# Patient Record
Sex: FEMALE | Race: WHITE | Employment: OTHER | URBAN - METROPOLITAN AREA
[De-identification: names, ages, dates, MRNs, and addresses within clinical notes are randomized per-mention and may not be internally consistent; named-entity substitution may affect disease eponyms.]

---

## 2019-08-31 ENCOUNTER — ED HISTORICAL/CONVERTED ENCOUNTER (OUTPATIENT)
Dept: OTHER | Age: 69
End: 2019-08-31

## 2019-10-23 ENCOUNTER — OP HISTORICAL/CONVERTED ENCOUNTER (OUTPATIENT)
Dept: OTHER | Age: 69
End: 2019-10-23

## 2019-11-23 ENCOUNTER — ED HISTORICAL/CONVERTED ENCOUNTER (OUTPATIENT)
Dept: OTHER | Age: 69
End: 2019-11-23

## 2020-09-03 VITALS
HEIGHT: 61 IN | HEART RATE: 87 BPM | WEIGHT: 186.2 LBS | BODY MASS INDEX: 35.16 KG/M2 | SYSTOLIC BLOOD PRESSURE: 168 MMHG | DIASTOLIC BLOOD PRESSURE: 98 MMHG | OXYGEN SATURATION: 98 % | TEMPERATURE: 98 F

## 2020-09-14 ENCOUNTER — OFFICE VISIT (OUTPATIENT)
Dept: INTERNAL MEDICINE CLINIC | Age: 70
End: 2020-09-14
Payer: MEDICARE

## 2020-09-14 VITALS
HEIGHT: 56 IN | BODY MASS INDEX: 42.02 KG/M2 | OXYGEN SATURATION: 100 % | SYSTOLIC BLOOD PRESSURE: 174 MMHG | DIASTOLIC BLOOD PRESSURE: 95 MMHG | HEART RATE: 89 BPM | WEIGHT: 186.8 LBS | TEMPERATURE: 98.4 F

## 2020-09-14 DIAGNOSIS — R53.82 CHRONIC FATIGUE: ICD-10-CM

## 2020-09-14 DIAGNOSIS — I83.813 VARICOSE VEINS OF BOTH LOWER EXTREMITIES WITH PAIN: ICD-10-CM

## 2020-09-14 DIAGNOSIS — E66.01 OBESITY, MORBID (HCC): ICD-10-CM

## 2020-09-14 DIAGNOSIS — M15.9 OSTEOARTHRITIS INVOLVING MULTIPLE JOINTS ON BOTH SIDES OF BODY: ICD-10-CM

## 2020-09-14 DIAGNOSIS — I10 ESSENTIAL HYPERTENSION: Primary | ICD-10-CM

## 2020-09-14 PROCEDURE — G0444 DEPRESSION SCREEN ANNUAL: HCPCS | Performed by: INTERNAL MEDICINE

## 2020-09-14 PROCEDURE — 99215 OFFICE O/P EST HI 40 MIN: CPT | Performed by: INTERNAL MEDICINE

## 2020-09-14 RX ORDER — ZOLPIDEM TARTRATE 5 MG/1
TABLET ORAL
COMMUNITY
Start: 2020-09-04 | End: 2020-09-14 | Stop reason: ALTCHOICE

## 2020-09-14 RX ORDER — AMLODIPINE BESYLATE 5 MG/1
5 TABLET ORAL DAILY
Qty: 30 TAB | Refills: 2 | Status: SHIPPED | OUTPATIENT
Start: 2020-09-14 | End: 2020-10-14

## 2020-09-14 RX ORDER — ERYTHROMYCIN 5 MG/G
OINTMENT OPHTHALMIC
COMMUNITY
End: 2020-09-14 | Stop reason: ALTCHOICE

## 2020-09-14 RX ORDER — HYDROCODONE BITARTRATE AND ACETAMINOPHEN 5; 325 MG/1; MG/1
1 TABLET ORAL DAILY
COMMUNITY

## 2020-09-14 NOTE — PROGRESS NOTES
Juana Conway is a 72 y.o. female and presents with Insomnia; Back Pain; Leg Pain; Foot Swelling; Foot Pain; Urinary Frequency; and Anxiety    Ms. Abebe Romero is here for follow up, last appointment was acute visit for tinea cruris, then she called for worsening of it, was referred to Dermatology, last follow up was in February:  \"Ms. Sun Garcia is here for follow up, last visit 12/11/2019 for medicare wellness, back then her blood pressure was too high, she decided to stop losartan saying it was giving her nightmares and they stopped when she stopped, so we started HCTZ. She has a list of 26 medication side effects, according to her she has a wide range of side effects from lisinopril, hydralazine, metoprolol, benicar, clonidine, diovan, lortab, and she absolutely refuses to try any of them again ever, so that leaves me with very few options for treatment. Her blood pressure is high today, she brought BP readings from home. She decided to take the HCTZ every 48 hours because she says it gave her dry mouth, and also said that since it makes her urinate more it is draining the fluid from her joints, so he is absolutely refusing to take it every day. She says she suspects as the rest of the medications she has tried in her life, this is giving her side effects as well from what she reads in google. She brought blood pressure diary, at home her blood pressure has been acceptable, most of the times within normal limits. Scanned into chart  She says her legs are bothering her. She says her knees have been hurting, specially when she walks, she feels her right thigh feels like she pulled a muscle which makes it difficult to walk, getting in and out of bed, she feels a pull when she tries to raise the leg into the bed. She takes hydrocodone/acetaminophen and tramadol which has been prescribed by pain management for unspecified chronic pain syndrome.  She says she lost her pain management doctor, and now she has to see a nurse practitioner in the same clinic, she asked me if I could continue prescribing her hydrocodone and tramadol. I told her she has to continue going to pain management. She says she will have a bladder test done in Feb 29th with [facility] for incontinence. essential hypertension  Today significantly high, but according to her bp diary it has been controlled, she has issues also with the HCTZ as mentioned in HPI. She has issues with almost every medication she takes (list of 26 medications in allergies), google's side effects and says she has them, and makes conclusions like the one she made today about HCTZ draining the fluid from her joints. I explained her this is not an accurate statement, her joint pain is not because of the HCTZ, for now she decided to agree to take it, but she has decided to take it every other day. Time for labs   Check CBC with differential/platelet  Check [id number]  Change hydroCHLOROthiazide 12.5 mg capsule from TAKE 1 CAPSULE BY MOUTH ONCE DAILY FOR 30 DAYS to 1 capsule every day for 90 days  hypothyroidism  Time to check TFTs. Off note: She asked the  to tell me to add cortisol to her labs, she does not need to have cortisol checked. Check TSH+free [id number]  osteoarthritis of knee  I counseled her about importance of weight loss, she says she's doing intermittent fasting. Order XR, knee, 3 view  pain of right thigh  findings as mentioned, she can try pt  Send for physical therapy referral\"    Last labs from June 2020 show A1C 5.7%, glucose 1455, gfr 65, normal electrolytes and calcium, cbc wnl, TFTs wnl from Feb 2020. Today she has several complaints, she gave me a chart with all her symptoms, which is scanned, she says that she's not doing good at all, what bothers her the mostly multiple joint pains in knees, right ankle, right thumb and right index finger, she says achy and sharp when she uses her joints, she denies any swelling or redness.  She had knee replacement in 2004 and 2006. She says she had history of \"miscplaced kneecaps\" and she had frequent falls when she was younger. She's currently on norco and tramadol under the care of pain management. She says this helps for a short time, she takes excedrin as well, but she doesn't feel functional as she wishes to feel. She also says she has fragile nails and hair loss, dry eye and dry mouth, swelling of feet and ankles. She did recently an initial evaluation off incontinence at [facility], but she did not schedule the cystoscopy because many family issues were going on she says the plan was to place a neural stimulaator but she's scared about that and wants a second opinion, but she is not yet sure of getting a second opinion quite this moment. She will let me know    For her blood pressure she brought blood pressure diary again today, many of the readings are within normal limits but some others are systolics in the 224S and 150s, readings scanned in chart. Today her blood pressure is 174/95. Review of Systems  Review of Systems   Constitutional: Negative for chills, fatigue, fever and unexpected weight change. HENT: Negative for congestion, ear pain, sneezing and sore throat. Eyes: Negative for pain and discharge. Respiratory: Negative for cough, shortness of breath and wheezing. Cardiovascular: Positive for leg swelling. Negative for chest pain and palpitations. Gastrointestinal: Negative for abdominal pain, blood in stool, constipation and diarrhea. Endocrine: Negative for polydipsia and polyuria. As mentioned in HPI   Genitourinary: Negative for difficulty urinating, dysuria, frequency, hematuria and urgency. Musculoskeletal: Positive for arthralgias. Negative for back pain and joint swelling. Skin: Negative for rash. Allergic/Immunologic: Negative for environmental allergies and food allergies.    Neurological: Negative for dizziness, speech difficulty, weakness, light-headedness, numbness and headaches. Hematological: Negative for adenopathy. Psychiatric/Behavioral: Negative for behavioral problems (Depression), sleep disturbance and suicidal ideas. Past Medical History:   Diagnosis Date    Anxiety     Arthritis     Bell's palsy     Chronic pain syndrome     Dislocated knee     Dysuria     Hyperglycemia     Hypertension     Hypothyroidism     IBS (irritable bowel syndrome)     Insomnia     Primary fibromyalgia syndrome     Spinal stenosis of lumbar region      Past Surgical History:   Procedure Laterality Date    HX BACK SURGERY      Lower    HX CARPAL TUNNEL RELEASE Right     HX  SECTION      2    HX COLONOSCOPY  2019    HX KNEE REPLACEMENT      both    HX OTHER SURGICAL Right     Trigger thumb     Social History     Socioeconomic History    Marital status:      Spouse name: Not on file    Number of children: Not on file    Years of education: Not on file    Highest education level: Not on file   Tobacco Use    Smoking status: Never Smoker    Smokeless tobacco: Never Used   Substance and Sexual Activity    Alcohol use: No     Alcohol/week: 0.0 standard drinks     Family History   Problem Relation Age of Onset    Thyroid Disease Mother     Cancer Mother     Cancer Father     Heart Disease Father     Thyroid Disease Sister      Current Outpatient Medications   Medication Sig Dispense Refill    compr.stocking,knee,long,large (Comp Stocking,Knee,Long,Large) misc Use during the day every day 2 Each 0    HYDROcodone-acetaminophen (NORCO) 5-325 mg per tablet Take 1 Tab by mouth daily.  amLODIPine (NORVASC) 5 mg tablet Take 1 Tab by mouth daily for 30 days. Indications: high blood pressure 30 Tab 2    melatonin 5 mg cap capsule Take 5 mg by mouth.  Calcium-Cholecalciferol, D3, 600 mg(1,500mg) -400 unit cap Take 1 Tab by mouth daily.  magnesium oxide (MAG-OX) 400 mg tablet Take 400 mg by mouth daily.       ZINC GLUCONATE PO Take 15 mg by mouth daily.  ASCORBATE CALCIUM (VITAMIN C PO) Take 1,000 mg by mouth daily.  potassium 99 mg tablet Take 99 mg by mouth daily.  omega-3 fatty acids-vitamin e 1,000 mg cap Take 1 Cap by mouth daily.  VITAMINS A AND D PO Take 1 Tab by mouth daily.  traMADol (ULTRAM) 50 mg tablet Take 50 mg by mouth nightly.  diphenoxylate-atropine (LOMOTIL) 2.5-0.025 mg per tablet Take 1 Tab by mouth as needed for Diarrhea.  cyclobenzaprine (FLEXERIL) 10 mg tablet Take 10 mg by mouth as needed for Muscle Spasm(s).  LACTOBACILLUS ACIDOPHILUS (PROBIOTIC PO) Take 1 Tab by mouth daily.  METHYLCELLULOSE (FIBER THERAPY) Take 1 Tab by mouth daily.  niacin  mg TbER Take 1 Tab by mouth daily.  Flaxseed Oil oil Take 1 Tab by mouth daily.  vitamin E (AQUA GEMS) 400 unit capsule Take 400 Units by mouth daily.  multivitamin (ONE A DAY) tablet Take 1 Tab by mouth daily.  cyanocobalamin (VITAMIN B12) 100 mcg tablet Take 100 mcg by mouth daily.  GARLIC PO Take 1 Tab by mouth daily.  Cranberry Extract 200 mg cap Take 3 Tabs by mouth daily.  docusate sodium (COLACE) 100 mg capsule Take 100 mg by mouth two (2) times a day.  acetaminophen-caffeine 500-65 mg (EXCEDRINE TENSION HEADACHE) 500-65 mg tab Take 2 Tabs by mouth every four (4) hours as needed for Headache.  levothyroxine (SYNTHROID) 75 mcg tablet Take 75 mcg by mouth Daily (before breakfast).  coenzyme q10 10 mg cap Take 1 Tab by mouth daily.        Allergies   Allergen Reactions    Benicar [Olmesartan] Other (comments)     Leg cramp    Caduet [Amlodipine-Atorvastatin] Other (comments)     Leg cramp    Celexa [Citalopram] Other (comments)     Malaise, fatigue    Chromium Unknown (comments)    Clonidine Other (comments)     Belching, leg cramp, stiffness, dry mouth    Crestor [Rosuvastatin] Other (comments)     Leg cramp    Cymbalta [Duloxetine] Other (comments) Insomnia    Declomycin [Demeclocycline] Unknown (comments)    Diovan [Valsartan] Myalgia    Effexor [Venlafaxine] Other (comments)     insomnia    Floxin [Ofloxacin] Other (comments)     Chest pain    Gabapentin Other (comments)     Extreme leg cramps, burning pain in intestines    Hydralazine Palpitations    Lexapro [Escitalopram] Other (comments)     General malaize    Lisinopril Other (comments)     Leg cramp      Lotrel [Amlodipine-Benazepril] Other (comments)     Leg cramp      Metoprolol Other (comments)     Belching,leg cramp,stiffness,dyr mouth    Metronidazole Nausea and Vomiting    Midol Ib Anxiety    Percocet [Oxycodone-Acetaminophen] Other (comments)     Leg pain      Restasis [Cyclosporine] Other (comments)     Photo sensitivity    Wellbutrin [Bupropion Hcl] Other (comments)     Menstrual cramps    Ziac [Bisoprolol-Hydrochlorothiazide] Shortness of Breath    Zoloft [Sertraline] Other (comments)     Blurred vision       Objective:  Visit Vitals  BP (!) 174/95 (BP 1 Location: Left leg, BP Patient Position: Sitting)   Pulse 89   Temp 98.4 °F (36.9 °C) (Oral)   Ht 4' 8\" (1.422 m)   Wt 186 lb 12.8 oz (84.7 kg)   SpO2 100% Comment: RA   BMI 41.88 kg/m²     Physical Exam:   Physical Exam  Constitutional:       General: She is not in acute distress. Appearance: Normal appearance. She is morbidly obese. HENT:      Head: Normocephalic and atraumatic. Mouth/Throat:      Mouth: Mucous membranes are moist.   Eyes:      Extraocular Movements: Extraocular movements intact. Conjunctiva/sclera: Conjunctivae normal.      Pupils: Pupils are equal, round, and reactive to light. Neck:      Musculoskeletal: Normal range of motion and neck supple. Cardiovascular:      Rate and Rhythm: Normal rate and regular rhythm. Pulses: Normal pulses. Heart sounds: Normal heart sounds. Pulmonary:      Effort: Pulmonary effort is normal.      Breath sounds: Normal breath sounds.    Abdominal: General: Abdomen is flat. Bowel sounds are normal. There is no distension. Palpations: Abdomen is soft. There is no mass. Tenderness: There is no abdominal tenderness. Musculoskeletal:         General: No swelling or deformity. Right lower leg: No edema. Left lower leg: No edema. Comments: Grade 1 varicose veins lower extremities bilateral, ankle swelling, no pitting edema. Tender   Lymphadenopathy:      Cervical: No cervical adenopathy. Skin:     General: Skin is warm and dry. Capillary Refill: Capillary refill takes less than 2 seconds. Coloration: Skin is not jaundiced or pale. Findings: No erythema or rash. Neurological:      General: No focal deficit present. Mental Status: She is alert and oriented to person, place, and time. Psychiatric:         Mood and Affect: Mood normal.         Behavior: Behavior normal.         Thought Content: Thought content normal.         Judgment: Judgment normal.          No results found for this or any previous visit. Assessment/Plan:    Time spent with patient: 45 minutes 50% of which was in coordination of care/counseling      Hypertension is not at goal, needs fluctuating at home. She has stopped taking the hydrochlorothiazide due to symptoms that she believes are side effects. To currently she is not on any medication to control her blood pressure. I discussed with her the limited options we have as mentioned above, she is willing to try amlodipine and see how she feels on it. She has osteoarthritis of multiple joints, limiting, she is already on pain management both Nucynta and Norco, which helps with the pain but she seems not to be achieving functionality as she wishes to, I am referring her to rheumatology for additional therapeutic options.   She has nonspecific chronic fatigue, mentions dry skin, dry mouth, dry eyes, brittle nails, we have done labs before thyroid function is within normal limits and autoimmune profile that we did last year was within normal limits as well. Last thing I am going to check is a morning cortisol. Varicose veins as mentioned with some ankle swelling and pain, I recommend her to use compression stockings every day  Discussed about importance and efforts to lose weight, 1200 calorie diet given, recommended brisk walking 30 to 40 mins, as much as she can tolerate considering her osteoarthritis, goal to lose 5% of her weight in 3 months. ICD-10-CM ICD-9-CM    1. Essential hypertension  I10 401.9 amLODIPine (NORVASC) 5 mg tablet   2. Osteoarthritis involving multiple joints on both sides of body  M15.9 715.89 REFERRAL TO RHEUMATOLOGY      REFERRAL TO PHYSICAL THERAPY   3. Chronic fatigue  R53.82 780.79 CORTISOL, AM      REFERRAL TO PHYSICAL THERAPY   4. Varicose veins of both lower extremities with pain  I83.813 454.8 compr.stocking,knee,long,large (Comp Stocking,Knee,Long,Large) misc   5. Obesity, morbid ([facility] [address]  E66.01 278.01      Orders Placed This Encounter    CORTISOL, AM    REFERRAL TO RHEUMATOLOGY     Referral Priority:   Routine     Referral Type:   Consultation     Referral Reason:   Specialty Services Required     Referred to Provider:   Ofe Lee MD     Number of Visits Requested:   1    REFERRAL TO PHYSICAL THERAPY     Referral Priority:   Routine     Referral Type:   PT/OT/ST     Referral Reason:   Specialty Services Required     Referral Location:   [address] [place] [address]     Number of Visits Requested:   1    HYDROcodone-acetaminophen (NORCO) 5-325 mg per tablet     Sig: Take 1 Tab by mouth daily.  DISCONTD: zolpidem (AMBIEN) 5 mg tablet     Sig: TAKE 1 TABLET BY MOUTH ONCE DAILY AS NEEDED    DISCONTD: erythromycin (ILOTYCIN) ophthalmic ointment     Sig: erythromycin 5 mg/gram (0.5 %) eye ointment    amLODIPine (NORVASC) 5 mg tablet     Sig: Take 1 Tab by mouth daily for 30 days.  Indications: high blood pressure     Dispense:  30 Tab Refill:  2    compr.stocking,knee,long,large (Comp Stocking,Knee,Long,Large) misc     Sig: Use during the day every day     Dispense:  2 Each     Refill:  0     15 - 20 mmHg       There are no Patient Instructions on file for this visit. Follow-up and Dispositions    · Return in about 3 months (around 12/14/2020).

## 2020-09-15 PROBLEM — E66.01 OBESITY, MORBID (HCC): Status: ACTIVE | Noted: 2020-09-15

## 2020-10-01 LAB — CORTIS AM PEAK SERPL-MCNC: 14.2 UG/DL (ref 6.2–19.4)

## 2020-10-26 DIAGNOSIS — G47.00 INSOMNIA, UNSPECIFIED TYPE: Primary | ICD-10-CM

## 2020-10-26 RX ORDER — ZOLPIDEM TARTRATE 5 MG/1
5 TABLET ORAL
Qty: 30 TAB | Refills: 2 | Status: SHIPPED | OUTPATIENT
Start: 2020-10-26 | End: 2021-02-03

## 2020-11-25 ENCOUNTER — TELEPHONE (OUTPATIENT)
Dept: INTERNAL MEDICINE CLINIC | Age: 70
End: 2020-11-25

## 2020-11-25 DIAGNOSIS — M79.10 MYALGIA: Primary | ICD-10-CM

## 2020-11-25 RX ORDER — CYCLOBENZAPRINE HCL 10 MG
10 TABLET ORAL
Qty: 90 TAB | Refills: 2 | Status: SHIPPED | OUTPATIENT
Start: 2020-11-25 | End: 2021-02-23

## 2020-11-25 NOTE — TELEPHONE ENCOUNTER
Patient called and would like Dr. Carolin Dunn to send in her prescription for her muscle relaxer. Send to [address]

## 2021-02-02 DIAGNOSIS — G47.00 INSOMNIA, UNSPECIFIED TYPE: ICD-10-CM

## 2021-02-03 RX ORDER — ZOLPIDEM TARTRATE 5 MG/1
TABLET ORAL
Qty: 30 TAB | Refills: 2 | Status: SHIPPED | OUTPATIENT
Start: 2021-02-03

## 2022-03-20 PROBLEM — E66.01 OBESITY, MORBID (HCC): Status: ACTIVE | Noted: 2020-09-15

## 2022-05-03 ENCOUNTER — TELEPHONE (OUTPATIENT)
Dept: INTERNAL MEDICINE CLINIC | Age: 72
End: 2022-05-03

## 2022-05-03 NOTE — TELEPHONE ENCOUNTER
----- Message from [place] Amada sent at 4/29/2022  8:25 AM EDT -----  Subject: Appointment Request    Reason for Call: Urgent Adult Urinary Problem    QUESTIONS  Type of Appointment? New Patient/New to Provider  Reason for appointment request? No appointments available during search  Additional Information for Provider? pt is a former pt of Dr Marisol Tsang. pt   has a new pt appt on 8/29/2022, pt has a UTI currently that started   4/26/2022, symptoms include frequent urination, burning, lower abd pain,   genital pain pt would like a call back. ---------------------------------------------------------------------------  --------------  Kacy SOLITARIO  What is the best way for the office to contact you? OK to leave message on   voicemail  Preferred Call Back Phone Number? [phone]  ---------------------------------------------------------------------------  --------------  SCRIPT ANSWERS  Relationship to Patient? Self  Specialty Confirmation? Primary Care  Have you recently (14 days) seen a provider for this problem? No  Have you been diagnosed with, awaiting test results for, or told that you   are suspected of having COVID-19 (Coronavirus)? (If patient has tested   negative or was tested as a requirement for work, school, or travel and   not based on symptoms, answer no)? No  Within the past 10 days have you developed any of the following symptoms   (answer no if symptoms have been present longer than 10 days or began   more than 10 days ago)? Fever or Chills, Cough, Shortness of breath or   difficulty breathing, Loss of taste or smell, Sore throat, Nasal   congestion, Sneezing or runny nose, Fatigue or generalized body aches   (answer no if pain is specific to a body part e.g. back pain), Diarrhea,   Headache? No  Have you had close contact with someone with COVID-19 in the last 7 days? No  (Service Expert  click yes below to proceed with [facility] As Usual   Scheduling)?  Yes

## 2023-05-16 RX ORDER — MAGNESIUM OXIDE 400 MG/1
400 TABLET ORAL DAILY
COMMUNITY

## 2023-05-16 RX ORDER — DIPHENOXYLATE HYDROCHLORIDE AND ATROPINE SULFATE 2.5; .025 MG/1; MG/1
1 TABLET ORAL PRN
COMMUNITY

## 2023-05-16 RX ORDER — TRAMADOL HYDROCHLORIDE 50 MG/1
50 TABLET ORAL NIGHTLY
COMMUNITY

## 2023-05-16 RX ORDER — LEVOTHYROXINE SODIUM 0.07 MG/1
75 TABLET ORAL
COMMUNITY

## 2023-05-16 RX ORDER — LANOLIN ALCOHOL/MO/W.PET/CERES
1 CREAM (GRAM) TOPICAL DAILY
COMMUNITY

## 2023-05-16 RX ORDER — PSEUDOEPHEDRINE HCL 30 MG
100 TABLET ORAL 2 TIMES DAILY
COMMUNITY

## 2023-05-16 RX ORDER — ZOLPIDEM TARTRATE 5 MG/1
TABLET ORAL
COMMUNITY
Start: 2021-02-03

## 2023-05-16 RX ORDER — HYDROCODONE BITARTRATE AND ACETAMINOPHEN 5; 325 MG/1; MG/1
1 TABLET ORAL DAILY
COMMUNITY

## 2023-11-21 ENCOUNTER — OFFICE VISIT (OUTPATIENT)
Facility: CLINIC | Age: 73
End: 2023-11-21
Payer: MEDICARE

## 2023-11-21 VITALS
HEART RATE: 82 BPM | SYSTOLIC BLOOD PRESSURE: 121 MMHG | WEIGHT: 189.2 LBS | OXYGEN SATURATION: 100 % | HEIGHT: 61 IN | DIASTOLIC BLOOD PRESSURE: 78 MMHG | BODY MASS INDEX: 35.72 KG/M2 | RESPIRATION RATE: 20 BRPM | TEMPERATURE: 98.4 F

## 2023-11-21 DIAGNOSIS — Z00.00 MEDICARE ANNUAL WELLNESS VISIT, SUBSEQUENT: Primary | ICD-10-CM

## 2023-11-21 DIAGNOSIS — Z11.59 NEED FOR HEPATITIS C SCREENING TEST: ICD-10-CM

## 2023-11-21 DIAGNOSIS — R30.0 DYSURIA: ICD-10-CM

## 2023-11-21 DIAGNOSIS — N30.00 ACUTE CYSTITIS WITHOUT HEMATURIA: ICD-10-CM

## 2023-11-21 DIAGNOSIS — Z12.11 SCREENING FOR COLON CANCER: ICD-10-CM

## 2023-11-21 DIAGNOSIS — R79.89 ELEVATED CORTISOL LEVEL: ICD-10-CM

## 2023-11-21 DIAGNOSIS — R45.4 IRRITABILITY AND ANGER: ICD-10-CM

## 2023-11-21 DIAGNOSIS — Z78.0 ENCOUNTER FOR OSTEOPOROSIS SCREENING IN ASYMPTOMATIC POSTMENOPAUSAL PATIENT: ICD-10-CM

## 2023-11-21 DIAGNOSIS — I10 ESSENTIAL HYPERTENSION: ICD-10-CM

## 2023-11-21 DIAGNOSIS — Z13.820 ENCOUNTER FOR OSTEOPOROSIS SCREENING IN ASYMPTOMATIC POSTMENOPAUSAL PATIENT: ICD-10-CM

## 2023-11-21 DIAGNOSIS — E03.4 HYPOTHYROIDISM DUE TO ACQUIRED ATROPHY OF THYROID: ICD-10-CM

## 2023-11-21 DIAGNOSIS — Z12.31 ENCOUNTER FOR SCREENING MAMMOGRAM FOR MALIGNANT NEOPLASM OF BREAST: ICD-10-CM

## 2023-11-21 LAB
BILIRUBIN, URINE, POC: NEGATIVE
BLOOD URINE, POC: NEGATIVE
GLUCOSE URINE, POC: NEGATIVE
KETONES, URINE, POC: NEGATIVE
LEUKOCYTE ESTERASE, URINE, POC: NORMAL
NITRITE, URINE, POC: NEGATIVE
PH, URINE, POC: 6 (ref 4.6–8)
PROTEIN,URINE, POC: NEGATIVE
SPECIFIC GRAVITY, URINE, POC: 1 (ref 1–1.03)
URINALYSIS CLARITY, POC: CLEAR
URINALYSIS COLOR, POC: YELLOW
UROBILINOGEN, POC: NORMAL

## 2023-11-21 PROCEDURE — G8400 PT W/DXA NO RESULTS DOC: HCPCS | Performed by: INTERNAL MEDICINE

## 2023-11-21 PROCEDURE — 3078F DIAST BP <80 MM HG: CPT | Performed by: INTERNAL MEDICINE

## 2023-11-21 PROCEDURE — 3017F COLORECTAL CA SCREEN DOC REV: CPT | Performed by: INTERNAL MEDICINE

## 2023-11-21 PROCEDURE — G0439 PPPS, SUBSEQ VISIT: HCPCS | Performed by: INTERNAL MEDICINE

## 2023-11-21 PROCEDURE — 1036F TOBACCO NON-USER: CPT | Performed by: INTERNAL MEDICINE

## 2023-11-21 PROCEDURE — G8484 FLU IMMUNIZE NO ADMIN: HCPCS | Performed by: INTERNAL MEDICINE

## 2023-11-21 PROCEDURE — 1123F ACP DISCUSS/DSCN MKR DOCD: CPT | Performed by: INTERNAL MEDICINE

## 2023-11-21 PROCEDURE — G8417 CALC BMI ABV UP PARAM F/U: HCPCS | Performed by: INTERNAL MEDICINE

## 2023-11-21 PROCEDURE — 3074F SYST BP LT 130 MM HG: CPT | Performed by: INTERNAL MEDICINE

## 2023-11-21 PROCEDURE — G8427 DOCREV CUR MEDS BY ELIG CLIN: HCPCS | Performed by: INTERNAL MEDICINE

## 2023-11-21 PROCEDURE — 81003 URINALYSIS AUTO W/O SCOPE: CPT | Performed by: INTERNAL MEDICINE

## 2023-11-21 PROCEDURE — 99204 OFFICE O/P NEW MOD 45 MIN: CPT | Performed by: INTERNAL MEDICINE

## 2023-11-21 PROCEDURE — 1090F PRES/ABSN URINE INCON ASSESS: CPT | Performed by: INTERNAL MEDICINE

## 2023-11-21 RX ORDER — VIT A/VIT C/VIT E/ZINC/COPPER 2148-113
1 TABLET ORAL DAILY
COMMUNITY

## 2023-11-21 RX ORDER — CYCLOBENZAPRINE HCL 10 MG
10 TABLET ORAL 2 TIMES DAILY PRN
COMMUNITY
Start: 2023-11-17

## 2023-11-21 RX ORDER — SPIRONOLACTONE 25 MG/1
25 TABLET ORAL DAILY
COMMUNITY
Start: 2023-11-16

## 2023-11-21 RX ORDER — CHLORAL HYDRATE 500 MG
1000 CAPSULE ORAL DAILY
COMMUNITY

## 2023-11-21 RX ORDER — NITROFURANTOIN 25; 75 MG/1; MG/1
100 CAPSULE ORAL 2 TIMES DAILY
Qty: 14 CAPSULE | Refills: 0 | Status: SHIPPED | OUTPATIENT
Start: 2023-11-21 | End: 2023-11-28

## 2023-11-21 RX ORDER — LACTOBACILLUS ACIDOPHILUS 500MM CELL
1 CAPSULE ORAL DAILY
COMMUNITY

## 2023-11-21 RX ORDER — TRAMADOL HYDROCHLORIDE 50 MG/1
1 TABLET ORAL DAILY
COMMUNITY
Start: 2022-10-04

## 2023-11-21 RX ORDER — GLUCOSAM/CHON-MSM1/C/MANG/BOSW 750-644 MG
1 TABLET ORAL DAILY
COMMUNITY

## 2023-11-21 SDOH — ECONOMIC STABILITY: INCOME INSECURITY: HOW HARD IS IT FOR YOU TO PAY FOR THE VERY BASICS LIKE FOOD, HOUSING, MEDICAL CARE, AND HEATING?: NOT HARD AT ALL

## 2023-11-21 SDOH — ECONOMIC STABILITY: FOOD INSECURITY: WITHIN THE PAST 12 MONTHS, THE FOOD YOU BOUGHT JUST DIDN'T LAST AND YOU DIDN'T HAVE MONEY TO GET MORE.: NEVER TRUE

## 2023-11-21 SDOH — ECONOMIC STABILITY: FOOD INSECURITY: WITHIN THE PAST 12 MONTHS, YOU WORRIED THAT YOUR FOOD WOULD RUN OUT BEFORE YOU GOT MONEY TO BUY MORE.: NEVER TRUE

## 2023-11-21 SDOH — ECONOMIC STABILITY: HOUSING INSECURITY
IN THE LAST 12 MONTHS, WAS THERE A TIME WHEN YOU DID NOT HAVE A STEADY PLACE TO SLEEP OR SLEPT IN A SHELTER (INCLUDING NOW)?: NO

## 2023-11-21 ASSESSMENT — LIFESTYLE VARIABLES
HOW OFTEN DO YOU HAVE A DRINK CONTAINING ALCOHOL: NEVER
HOW MANY STANDARD DRINKS CONTAINING ALCOHOL DO YOU HAVE ON A TYPICAL DAY: PATIENT DOES NOT DRINK

## 2023-11-21 ASSESSMENT — PATIENT HEALTH QUESTIONNAIRE - PHQ9
1. LITTLE INTEREST OR PLEASURE IN DOING THINGS: 1
SUM OF ALL RESPONSES TO PHQ QUESTIONS 1-9: 2
SUM OF ALL RESPONSES TO PHQ9 QUESTIONS 1 & 2: 2
2. FEELING DOWN, DEPRESSED OR HOPELESS: 1

## 2023-11-21 NOTE — PROGRESS NOTES
Medicare Annual Wellness Visit    Francine Ortega is here for Medicare AWV    Assessment & Plan   Medicare annual wellness visit, subsequent  Screening for colon cancer  -     Cologuard (Fecal DNA Colorectal Cancer Screening)  Encounter for screening mammogram for malignant neoplasm of breast  -     ESTUARDO HAYDEE DIGITAL SCREEN BILATERAL; Future  Encounter for osteoporosis screening in asymptomatic postmenopausal patient  -     DEXA BONE DENSITY AXIAL SKELETON; Future  Essential hypertension  -     CBC with Auto Differential; Future  -     Comprehensive Metabolic Panel; Future  -     Lipid Panel; Future  Hypothyroidism due to acquired atrophy of thyroid  -     TSH; Future  -     T4, Free; Future  Dysuria  -     AMB POC URINALYSIS DIP STICK AUTO W/O MICRO  Elevated cortisol level  Comments:  As per patient she used to have high cortisol in the past  Orders:  -     CORTISOL AM, TOTAL; Future  Need for hepatitis C screening test  -     Hepatitis C Antibody; Future  Irritability and anger  -     Amb External Referral To Behavioral Health  Acute cystitis without hematuria  -     nitrofurantoin, macrocrystal-monohydrate, (MACROBID) 100 MG capsule; Take 1 capsule by mouth 2 times daily for 7 days, Disp-14 capsule, R-0Normal    Given resources for counseling. Updating HM. With her annual labs including cortisol level because she says that she used to have elevated cortisol years ago. She has a UTI, treat as above    Recommendations for Preventive Services Due: see orders and patient instructions/AVS.  Recommended screening schedule for the next 5-10 years is provided to the patient in written form: see Patient Instructions/AVS.     Return in 3 months (on 2/21/2024). Subjective   The following acute and/or chronic problems were also addressed today: We are doing medicare wellness and she is re establishing care as I have not seen her in more than 3 years since September 2020.    She has hypertension, hypothyroidism,

## 2023-11-23 LAB
ALBUMIN SERPL-MCNC: 4.2 G/DL (ref 3.8–4.8)
ALBUMIN/GLOB SERPL: 1.6 {RATIO} (ref 1.2–2.2)
ALP SERPL-CCNC: 82 IU/L (ref 44–121)
ALT SERPL-CCNC: 16 IU/L (ref 0–32)
AST SERPL-CCNC: 19 IU/L (ref 0–40)
BASOPHILS # BLD AUTO: 0.1 X10E3/UL (ref 0–0.2)
BASOPHILS NFR BLD AUTO: 1 %
BILIRUB SERPL-MCNC: 0.3 MG/DL (ref 0–1.2)
BUN SERPL-MCNC: 15 MG/DL (ref 8–27)
BUN/CREAT SERPL: 16 (ref 12–28)
CALCIUM SERPL-MCNC: 9.8 MG/DL (ref 8.7–10.3)
CHLORIDE SERPL-SCNC: 101 MMOL/L (ref 96–106)
CHOLEST SERPL-MCNC: 211 MG/DL (ref 100–199)
CO2 SERPL-SCNC: 26 MMOL/L (ref 20–29)
CREAT SERPL-MCNC: 0.93 MG/DL (ref 0.57–1)
EGFRCR SERPLBLD CKD-EPI 2021: 65 ML/MIN/1.73
EOSINOPHIL # BLD AUTO: 0.6 X10E3/UL (ref 0–0.4)
EOSINOPHIL NFR BLD AUTO: 7 %
ERYTHROCYTE [DISTWIDTH] IN BLOOD BY AUTOMATED COUNT: 12.1 % (ref 11.7–15.4)
GLOBULIN SER CALC-MCNC: 2.6 G/DL (ref 1.5–4.5)
GLUCOSE SERPL-MCNC: 105 MG/DL (ref 70–99)
HCT VFR BLD AUTO: 44.3 % (ref 34–46.6)
HCV IGG SERPL QL IA: NON REACTIVE
HDLC SERPL-MCNC: 64 MG/DL
HGB BLD-MCNC: 14.6 G/DL (ref 11.1–15.9)
IMM GRANULOCYTES # BLD AUTO: 0 X10E3/UL (ref 0–0.1)
IMM GRANULOCYTES NFR BLD AUTO: 0 %
LDLC SERPL CALC-MCNC: 130 MG/DL (ref 0–99)
LYMPHOCYTES # BLD AUTO: 2.2 X10E3/UL (ref 0.7–3.1)
LYMPHOCYTES NFR BLD AUTO: 25 %
MCH RBC QN AUTO: 30.9 PG (ref 26.6–33)
MCHC RBC AUTO-ENTMCNC: 33 G/DL (ref 31.5–35.7)
MCV RBC AUTO: 94 FL (ref 79–97)
MONOCYTES # BLD AUTO: 1.1 X10E3/UL (ref 0.1–0.9)
MONOCYTES NFR BLD AUTO: 12 %
NEUTROPHILS # BLD AUTO: 4.9 X10E3/UL (ref 1.4–7)
NEUTROPHILS NFR BLD AUTO: 55 %
PLATELET # BLD AUTO: 365 X10E3/UL (ref 150–450)
POTASSIUM SERPL-SCNC: 4.9 MMOL/L (ref 3.5–5.2)
PROT SERPL-MCNC: 6.8 G/DL (ref 6–8.5)
RBC # BLD AUTO: 4.73 X10E6/UL (ref 3.77–5.28)
SODIUM SERPL-SCNC: 140 MMOL/L (ref 134–144)
T4 FREE SERPL-MCNC: 1.48 NG/DL (ref 0.82–1.77)
TRIGL SERPL-MCNC: 98 MG/DL (ref 0–149)
TSH SERPL DL<=0.005 MIU/L-ACNC: 3.47 UIU/ML (ref 0.45–4.5)
VLDLC SERPL CALC-MCNC: 17 MG/DL (ref 5–40)
WBC # BLD AUTO: 9 X10E3/UL (ref 3.4–10.8)

## 2023-11-24 LAB — CORTIS AM PEAK SERPL-MCNC: 12.6 UG/DL (ref 6.2–19.4)

## 2023-11-26 NOTE — RESULT ENCOUNTER NOTE
Please let her know  cholesterol is elevated, please give her diet recs.  All other results are normal, including cortisol, which was also normal 3 years, ago so no need to check on this anymore moving forward.  Thanks   Diet recs for high cholesterol:  Avoid sugary drinks, eat only 1 starch per meal, portion should be not more than 1 cup, avoid bad fats that are in anything deep fried, fast foods, eat good fats that are in avocado, nuts, fish, seafood, olive oil, eat lean meats

## 2024-01-18 LAB — NONINV COLON CA DNA+OCC BLD SCRN STL QL: NEGATIVE

## 2024-02-05 ENCOUNTER — TELEPHONE (OUTPATIENT)
Facility: CLINIC | Age: 74
End: 2024-02-05

## 2024-02-05 NOTE — TELEPHONE ENCOUNTER
Patient is requesting a refill on zolpidem 5 mg to [facility]. She has an upcoming appointment with Dr. Avelar on 4-18-24

## 2024-04-22 ENCOUNTER — OFFICE VISIT (OUTPATIENT)
Age: 74
End: 2024-04-22
Payer: MEDICARE

## 2024-04-22 VITALS
HEART RATE: 60 BPM | WEIGHT: 187.6 LBS | DIASTOLIC BLOOD PRESSURE: 87 MMHG | BODY MASS INDEX: 35.42 KG/M2 | TEMPERATURE: 97.5 F | HEIGHT: 61 IN | SYSTOLIC BLOOD PRESSURE: 145 MMHG | OXYGEN SATURATION: 95 %

## 2024-04-22 DIAGNOSIS — E03.4 HYPOTHYROIDISM DUE TO ACQUIRED ATROPHY OF THYROID: Primary | ICD-10-CM

## 2024-04-22 DIAGNOSIS — E66.01 OBESITY, MORBID (HCC): ICD-10-CM

## 2024-04-22 PROCEDURE — G8400 PT W/DXA NO RESULTS DOC: HCPCS | Performed by: INTERNAL MEDICINE

## 2024-04-22 PROCEDURE — 99203 OFFICE O/P NEW LOW 30 MIN: CPT | Performed by: INTERNAL MEDICINE

## 2024-04-22 PROCEDURE — G8417 CALC BMI ABV UP PARAM F/U: HCPCS | Performed by: INTERNAL MEDICINE

## 2024-04-22 PROCEDURE — G8427 DOCREV CUR MEDS BY ELIG CLIN: HCPCS | Performed by: INTERNAL MEDICINE

## 2024-04-22 PROCEDURE — 1090F PRES/ABSN URINE INCON ASSESS: CPT | Performed by: INTERNAL MEDICINE

## 2024-04-22 PROCEDURE — 1123F ACP DISCUSS/DSCN MKR DOCD: CPT | Performed by: INTERNAL MEDICINE

## 2024-04-22 PROCEDURE — 3017F COLORECTAL CA SCREEN DOC REV: CPT | Performed by: INTERNAL MEDICINE

## 2024-04-22 PROCEDURE — 1036F TOBACCO NON-USER: CPT | Performed by: INTERNAL MEDICINE

## 2024-04-22 NOTE — PROGRESS NOTES
[facility] DIABETES AND ENDOCRINOLOGY              Kristine Small MD FACE        PCP: Hazel Shields APRN - NP     Referring Physician:  referred  by  pcp     Alexander Cortez is a 74 y.o. female patient.    Thyroid Problem  Symptoms include fatigue.          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 She dealt with leg length disparity all along . She has always tucked some extra material in her shoes  to balance herself and avoid falls   Despite which she did not break bones, but injured her back .  At age 51, she underwent the iliotibial stabilization surgery for it but not much of help     She  has carpel tunnel, works on computer all day   She listed out all her symptoms on a paper -  chronic fatigue, hair loss, weight gain , depression, crispy skin  and constipation , dry skin etc., memory changes     Pt is following  Dr. Wilkinson as Dr. Pillai retired,  for HTN   and   for leg edema, and is now on spironolactone       Past Medical History:   Diagnosis Date    Anxiety     Arthritis     Bell's palsy     Chronic pain syndrome     Dislocated knee     Dysuria     Hyperglycemia     Hypertension     Hypothyroidism     IBS (irritable bowel syndrome)     Insomnia     Primary fibromyalgia syndrome     Spinal stenosis of lumbar region        Social History     Socioeconomic History    Marital status:      Spouse name: Not on file    Number of children: Not on file    Years of education: Not on file    Highest education level: Not on file   Occupational History    Not on file   Tobacco Use    Smoking status: Never    Smokeless tobacco: Never   Substance and Sexual Activity    Alcohol use: No     Alcohol/week: 0.0 standard drinks of alcohol    Drug use: Not on file    Sexual activity: Yes     Partners: Male   Other Topics

## 2024-04-22 NOTE — PATIENT INSTRUCTIONS
SPECIFIC INSTRUCTIONS BELOW     Levothyroxine  75 mcg a day , on empty stomach with water only, no other meds or food or drinks   For next half hour ( PCP )       Take any kind of vitamins, calcium, iron   Pills  4 hours later          -------------PAY ATTENTION TO THESE GENERAL INSTRUCTIONS -----------------      - The medications prescribed at this visit will not be available at pharmacy until 6 pm today        - your med list is not updated until the visit encounter is closed, so FOLLOW THE TYPED SPECIFIC INSTRUCTIONS  ABOVE     -ANY tests other than blood work, which you opt to do  outside the  [facility] imaging facilities, you are responsible for prior authorizations if  required    - health maintenance will not be updated  on AVS, so please ignore it       Results     *Normal results will not be notified by a phone call starting January 1 2024   *If you have an upcoming visit, the results will be discussed at the visit   *Please sign up for MY CHART if you want access to your lab and test results  *Abnormal results which require immediate attention will be notified by phone call   *Abnormal results which do not require immediate assistance will be notified in 1-2 weeks       Refills    -    have your pharmacy send us a refill request . Refills are done max for one year and a visit is a must before refills are extended    Follow up appointments -  highly encourage you to make it when you are checking out. We can accommodate you into the schedule based on your clinical situation, but not for extending refills beyond a year. Labs are important to give refills and it is important to get labs before the visit     Phone calls  -  Allow  24 hrs. for non-urgent calls to be returned  Prior authorization - It may take 2 to 4 weeks to process  Forms  -  FMLA, DMV etc., will take up to 2 weeks to process  Cancellations - please notify the office 2 days in advance   Samples  - will only be dispensed at visits       If

## 2024-05-01 LAB
ACTH PLAS-MCNC: 26 PG/ML (ref 7.2–63.3)
CORTIS AM PEAK SERPL-MCNC: 9.2 UG/DL (ref 4.3–22.45)
ESTRADIOL SERPL-MCNC: 20.6 PG/ML
FSH SERPL-ACNC: 76 MIU/ML (ref 25.8–134.8)
HCT VFR BLD AUTO: 44.1 % (ref 35–47)
HGB BLD-MCNC: 14.9 G/DL (ref 11.5–16)
IGF-I SERPL-MCNC: 112 NG/ML (ref 48–191)
LH SERPL-ACNC: 42.5 MIU/ML (ref 7.7–58.5)
THYROGLOB AB SERPL-ACNC: <1 IU/ML (ref 0–0.9)
THYROPEROXIDASE AB SERPL-ACNC: <9 IU/ML (ref 0–34)

## 2024-05-02 ENCOUNTER — TELEPHONE (OUTPATIENT)
Age: 74
End: 2024-05-02